# Patient Record
Sex: FEMALE | Race: WHITE | NOT HISPANIC OR LATINO | Employment: OTHER | URBAN - METROPOLITAN AREA
[De-identification: names, ages, dates, MRNs, and addresses within clinical notes are randomized per-mention and may not be internally consistent; named-entity substitution may affect disease eponyms.]

---

## 2017-01-26 ENCOUNTER — TRANSCRIBE ORDERS (OUTPATIENT)
Dept: ADMINISTRATIVE | Facility: HOSPITAL | Age: 72
End: 2017-01-26

## 2017-01-26 DIAGNOSIS — Z12.31 VISIT FOR SCREENING MAMMOGRAM: Primary | ICD-10-CM

## 2017-03-02 ENCOUNTER — HOSPITAL ENCOUNTER (OUTPATIENT)
Dept: MAMMOGRAPHY | Facility: HOSPITAL | Age: 72
Discharge: HOME | End: 2017-03-02
Admitting: FAMILY MEDICINE

## 2017-03-02 DIAGNOSIS — Z12.31 VISIT FOR SCREENING MAMMOGRAM: ICD-10-CM

## 2017-03-02 PROCEDURE — 77063 BREAST TOMOSYNTHESIS BI: CPT

## 2017-03-02 PROCEDURE — G0202 SCR MAMMO BI INCL CAD: HCPCS | Performed by: RADIOLOGY

## 2017-03-02 PROCEDURE — 77063 BREAST TOMOSYNTHESIS BI: CPT | Performed by: RADIOLOGY

## 2017-03-02 PROCEDURE — G0202 SCR MAMMO BI INCL CAD: HCPCS

## 2018-02-19 ENCOUNTER — TRANSCRIBE ORDERS (OUTPATIENT)
Dept: MAMMOGRAPHY | Facility: HOSPITAL | Age: 73
End: 2018-02-19

## 2018-02-19 DIAGNOSIS — Z12.31 VISIT FOR SCREENING MAMMOGRAM: Primary | ICD-10-CM

## 2018-03-22 ENCOUNTER — HOSPITAL ENCOUNTER (OUTPATIENT)
Dept: MAMMOGRAPHY | Facility: HOSPITAL | Age: 73
Discharge: HOME | End: 2018-03-22

## 2018-04-16 ENCOUNTER — APPOINTMENT (OUTPATIENT)
Dept: MAMMOGRAPHY | Facility: HOSPITAL | Age: 73
End: 2018-04-16

## 2018-04-24 ENCOUNTER — HOSPITAL ENCOUNTER (OUTPATIENT)
Dept: MAMMOGRAPHY | Facility: HOSPITAL | Age: 73
Discharge: HOME | End: 2018-04-24
Admitting: FAMILY MEDICINE

## 2018-04-24 DIAGNOSIS — Z12.31 VISIT FOR SCREENING MAMMOGRAM: ICD-10-CM

## 2018-04-24 PROCEDURE — 77067 SCR MAMMO BI INCL CAD: CPT | Performed by: RADIOLOGY

## 2018-04-24 PROCEDURE — 77063 BREAST TOMOSYNTHESIS BI: CPT

## 2018-04-24 PROCEDURE — 77067 SCR MAMMO BI INCL CAD: CPT

## 2018-04-24 PROCEDURE — 77063 BREAST TOMOSYNTHESIS BI: CPT | Performed by: RADIOLOGY

## 2018-05-17 ENCOUNTER — TRANSCRIBE ORDERS (OUTPATIENT)
Dept: MAMMOGRAPHY | Facility: HOSPITAL | Age: 73
End: 2018-05-17

## 2018-05-17 ENCOUNTER — HOSPITAL ENCOUNTER (OUTPATIENT)
Dept: MAMMOGRAPHY | Facility: HOSPITAL | Age: 73
Discharge: HOME | End: 2018-05-17
Admitting: FAMILY MEDICINE

## 2018-05-17 ENCOUNTER — HOSPITAL ENCOUNTER (OUTPATIENT)
Dept: ULTRASOUND IMAGING | Facility: HOSPITAL | Age: 73
Discharge: HOME | End: 2018-05-17

## 2018-05-17 DIAGNOSIS — R92.8 ABNORMAL MAMMOGRAM: Primary | ICD-10-CM

## 2018-05-17 DIAGNOSIS — R92.8 ABNORMAL MAMMOGRAM: ICD-10-CM

## 2018-05-17 PROCEDURE — 77065 DX MAMMO INCL CAD UNI: CPT | Performed by: RADIOLOGY

## 2018-05-17 PROCEDURE — 76642 ULTRASOUND BREAST LIMITED: CPT

## 2018-05-17 PROCEDURE — 77065 DX MAMMO INCL CAD UNI: CPT

## 2018-05-17 PROCEDURE — G0279 TOMOSYNTHESIS, MAMMO: HCPCS | Performed by: RADIOLOGY

## 2018-05-17 PROCEDURE — G0279 TOMOSYNTHESIS, MAMMO: HCPCS

## 2018-05-17 PROCEDURE — 76642 ULTRASOUND BREAST LIMITED: CPT | Performed by: RADIOLOGY

## 2018-11-19 ENCOUNTER — HOSPITAL ENCOUNTER (OUTPATIENT)
Dept: MAMMOGRAPHY | Facility: HOSPITAL | Age: 73
Discharge: HOME | End: 2018-11-19
Admitting: FAMILY MEDICINE

## 2018-11-19 ENCOUNTER — TRANSCRIBE ORDERS (OUTPATIENT)
Dept: MAMMOGRAPHY | Facility: HOSPITAL | Age: 73
End: 2018-11-19

## 2018-11-19 DIAGNOSIS — R92.8 ABNORMAL MAMMOGRAM: Primary | ICD-10-CM

## 2018-11-19 DIAGNOSIS — R92.8 ABNORMAL MAMMOGRAM: ICD-10-CM

## 2018-11-19 PROCEDURE — 77065 DX MAMMO INCL CAD UNI: CPT | Performed by: RADIOLOGY

## 2018-11-19 PROCEDURE — 77065 DX MAMMO INCL CAD UNI: CPT

## 2018-11-27 ENCOUNTER — APPOINTMENT (OUTPATIENT)
Dept: MAMMOGRAPHY | Facility: HOSPITAL | Age: 73
End: 2018-11-27

## 2019-05-21 ENCOUNTER — APPOINTMENT (OUTPATIENT)
Dept: MAMMOGRAPHY | Facility: HOSPITAL | Age: 74
End: 2019-05-21

## 2019-07-16 ENCOUNTER — HOSPITAL ENCOUNTER (OUTPATIENT)
Dept: MAMMOGRAPHY | Facility: HOSPITAL | Age: 74
Discharge: HOME | End: 2019-07-16
Admitting: FAMILY MEDICINE

## 2019-07-16 DIAGNOSIS — R92.8 ABNORMAL MAMMOGRAM: ICD-10-CM

## 2019-07-16 PROCEDURE — G0279 TOMOSYNTHESIS, MAMMO: HCPCS | Performed by: RADIOLOGY

## 2019-07-16 PROCEDURE — 77066 DX MAMMO INCL CAD BI: CPT | Performed by: RADIOLOGY

## 2019-07-16 PROCEDURE — G0279 TOMOSYNTHESIS, MAMMO: HCPCS

## 2019-07-16 PROCEDURE — 77066 DX MAMMO INCL CAD BI: CPT

## 2019-07-29 ENCOUNTER — TRANSCRIBE ORDERS (OUTPATIENT)
Dept: ADMINISTRATIVE | Facility: HOSPITAL | Age: 74
End: 2019-07-29

## 2019-07-29 DIAGNOSIS — Z12.31 VISIT FOR SCREENING MAMMOGRAM: Primary | ICD-10-CM

## 2020-07-23 ENCOUNTER — HOSPITAL ENCOUNTER (OUTPATIENT)
Dept: MAMMOGRAPHY | Facility: HOSPITAL | Age: 75
Discharge: HOME | End: 2020-07-23
Admitting: FAMILY MEDICINE

## 2020-07-23 DIAGNOSIS — Z12.31 VISIT FOR SCREENING MAMMOGRAM: ICD-10-CM

## 2020-07-23 PROCEDURE — 77067 SCR MAMMO BI INCL CAD: CPT

## 2020-07-23 PROCEDURE — 77063 BREAST TOMOSYNTHESIS BI: CPT

## 2020-07-23 PROCEDURE — 77067 SCR MAMMO BI INCL CAD: CPT | Performed by: RADIOLOGY

## 2020-07-23 PROCEDURE — 77063 BREAST TOMOSYNTHESIS BI: CPT | Performed by: RADIOLOGY

## 2020-09-10 ENCOUNTER — TRANSCRIBE ORDERS (OUTPATIENT)
Dept: ADMINISTRATIVE | Facility: HOSPITAL | Age: 75
End: 2020-09-10

## 2020-09-10 DIAGNOSIS — Z12.31 VISIT FOR SCREENING MAMMOGRAM: Primary | ICD-10-CM

## 2021-07-26 ENCOUNTER — HOSPITAL ENCOUNTER (OUTPATIENT)
Dept: MAMMOGRAPHY | Facility: HOSPITAL | Age: 76
Discharge: HOME | End: 2021-07-26
Admitting: FAMILY MEDICINE

## 2021-07-26 DIAGNOSIS — Z12.31 VISIT FOR SCREENING MAMMOGRAM: ICD-10-CM

## 2021-07-26 PROCEDURE — 77063 BREAST TOMOSYNTHESIS BI: CPT

## 2021-07-26 PROCEDURE — 77063 BREAST TOMOSYNTHESIS BI: CPT | Performed by: RADIOLOGY

## 2021-07-26 PROCEDURE — 77067 SCR MAMMO BI INCL CAD: CPT | Performed by: RADIOLOGY

## 2021-07-26 PROCEDURE — 77067 SCR MAMMO BI INCL CAD: CPT

## 2021-11-22 ENCOUNTER — TRANSCRIBE ORDERS (OUTPATIENT)
Dept: ADMINISTRATIVE | Facility: HOSPITAL | Age: 76
End: 2021-11-22

## 2021-11-22 DIAGNOSIS — Z12.31 VISIT FOR SCREENING MAMMOGRAM: Primary | ICD-10-CM

## 2022-06-09 ENCOUNTER — OFFICE VISIT (OUTPATIENT)
Dept: ENDOCRINOLOGY | Facility: CLINIC | Age: 77
End: 2022-06-09

## 2022-06-09 ENCOUNTER — LAB (OUTPATIENT)
Dept: LAB | Facility: HOSPITAL | Age: 77
End: 2022-06-09

## 2022-06-09 VITALS
OXYGEN SATURATION: 97 % | HEART RATE: 69 BPM | DIASTOLIC BLOOD PRESSURE: 62 MMHG | WEIGHT: 148.4 LBS | BODY MASS INDEX: 25.33 KG/M2 | SYSTOLIC BLOOD PRESSURE: 124 MMHG | HEIGHT: 64 IN

## 2022-06-09 DIAGNOSIS — E03.9 HYPOTHYROIDISM (ACQUIRED): ICD-10-CM

## 2022-06-09 DIAGNOSIS — R73.9 HYPERGLYCEMIA: ICD-10-CM

## 2022-06-09 DIAGNOSIS — E03.9 HYPOTHYROIDISM (ACQUIRED): Primary | ICD-10-CM

## 2022-06-09 PROCEDURE — 99203 OFFICE O/P NEW LOW 30 MIN: CPT | Performed by: INTERNAL MEDICINE

## 2022-06-09 RX ORDER — LOVASTATIN 40 MG/1
40 TABLET ORAL NIGHTLY
COMMUNITY
Start: 2022-05-16

## 2022-06-09 RX ORDER — LEVOTHYROXINE SODIUM 0.05 MG/1
50 TABLET ORAL DAILY
COMMUNITY
Start: 2022-05-05

## 2022-06-09 RX ORDER — MULTIVIT-MIN/IRON/FOLIC ACID/K 18-600-40
CAPSULE ORAL
COMMUNITY

## 2022-06-09 RX ORDER — PHENOL 1.4 %
600 AEROSOL, SPRAY (ML) MUCOUS MEMBRANE DAILY
COMMUNITY

## 2022-06-09 NOTE — ASSESSMENT & PLAN NOTE
A1c was 5.7%.  We discussed option of metformin to help with weight.  Will not start medication yet.

## 2022-06-09 NOTE — ASSESSMENT & PLAN NOTE
Check TFTs today.  Continue T4.  Take calcium separately from the T4.  We discussed goal for TSH of 1-2 range.    We discussed biotin supplement for hair loss also.  She would need to stop this a couple of days before getting labs done though.

## 2022-06-09 NOTE — PROGRESS NOTES
"     Office Note      Date: 2022  Patient Name: Alee Recinos  MRN: [id number]  : 1945    Chief Complaint   Patient presents with   • Hypothyroidism       History of Present Illness:   Alee Recinos is a 76 y.o. female who presents for Hypothyroidism    She reports h/o hypothyroidism.  She has been treated with T4.  She is currently taking 50mcg qd.  She is taking this on an empty stomach.  She is taking calcium concurrently.  She c/o inability to lose weight.  She notes hair loss.  She notes occ anxiousness.  She denies any other sxs of hypo- or hyperthyroidism at this time.  She denies any h/o goiter.  She hasn't noted any change in the size of her neck.  She denies any compressive sxs.  She had labs done 2022 that showed TSH of 2.88.  She denies any biotin use.  She says her beautician commented on hair loss.  She saw her PCP who did labs that were okay.  She started using rogaine topically.  This seems to have helped.      Subjective      Patient was born where: [place].  Facial radiation exposure: No.  High iodine intake: No  Family hx of thyroid disease: Yes, describe: mother.    Review of Systems:   Review of Systems   Constitutional: Negative.    HENT: Positive for tinnitus.    Eyes: Negative.    Respiratory: Negative.    Cardiovascular: Negative.    Gastrointestinal: Negative.    Endocrine: Negative.    Genitourinary: Negative.    Musculoskeletal: Negative.    Skin: Negative.         Hair Loss   Allergic/Immunologic: Negative.    Neurological: Negative.    Hematological: Negative.    Psychiatric/Behavioral: The patient is nervous/anxious.        The following portions of the patient's history were reviewed and updated as appropriate: allergies, current medications, past family history, past medical history, past social history, past surgical history and problem list.    Objective     Visit Vitals  /62   Pulse 69   Ht 162.6 cm (64\")   Wt 67.3 kg (148 lb 6.4 oz)   SpO2 97%   BMI " 25.47 kg/m²       Physical Exam:  Physical Exam  Constitutional:       Appearance: Normal appearance.   HENT:      Head: Normocephalic and atraumatic.   Eyes:      Extraocular Movements: Extraocular movements intact.      Conjunctiva/sclera: Conjunctivae normal.      Pupils: Pupils are equal, round, and reactive to light.   Neck:      Thyroid: No thyroid mass, thyromegaly or thyroid tenderness.   Cardiovascular:      Rate and Rhythm: Normal rate and regular rhythm.      Pulses: Normal pulses.      Heart sounds: Normal heart sounds.   Pulmonary:      Effort: Pulmonary effort is normal.      Breath sounds: Normal breath sounds.   Abdominal:      General: Bowel sounds are normal.      Palpations: Abdomen is soft.   Musculoskeletal:         General: Normal range of motion.      Cervical back: Normal range of motion and neck supple.   Lymphadenopathy:      Cervical: No cervical adenopathy.   Skin:     General: Skin is warm and dry.   Neurological:      General: No focal deficit present.      Mental Status: She is alert.   Psychiatric:         Mood and Affect: Mood normal.         Behavior: Behavior normal.         Thought Content: Thought content normal.         Judgment: Judgment normal.         Labs:    TSH  No results found for: TSHBASE     Free T4  No results found for: FREET4    T3  No results found for: [id number]      TPO  No results found for: THYROIDAB    TG AB  No results found for: THGAB    TG  No results found for: THYROGLB    CBC w/DIFF  Lab Results   Component Value Date    WBC 8.68 01/26/2022    RBC 4.76 01/26/2022    HGB 14.6 01/26/2022    HCT 44.8 01/26/2022    MCV 94 01/26/2022    MCH 30.7 01/26/2022    MCHC 32.6 01/26/2022    RDW 12.4 01/26/2022    MPV 11.0 01/26/2022     01/26/2022    NRBC 0.0 01/26/2022           Assessment / Plan      Assessment & Plan:  Diagnoses and all orders for this visit:    1. Hypothyroidism (acquired) (Primary)  Assessment & Plan:  Check TFTs today.  Continue T4.  Take  calcium separately from the T4.  We discussed goal for TSH of 1-2 range.    We discussed biotin supplement for hair loss also.  She would need to stop this a couple of days before getting labs done though.    Orders:  -     TSH; Future  -     T4, Free; Future    2. Hyperglycemia  Assessment & Plan:  A1c was 5.7%.  We discussed option of metformin to help with weight.  Will not start medication yet.         Return in about 3 months (around 9/9/2022) for Recheck with TSH.    Mario Fischer MD   06/09/2022

## 2022-06-10 LAB
T4 FREE SERPL-MCNC: 1.24 NG/DL (ref 0.93–1.7)
TSH SERPL DL<=0.005 MIU/L-ACNC: 2.93 UIU/ML (ref 0.27–4.2)

## 2022-07-29 ENCOUNTER — APPOINTMENT (OUTPATIENT)
Dept: MAMMOGRAPHY | Facility: HOSPITAL | Age: 77
End: 2022-07-29

## 2022-08-03 ENCOUNTER — HOSPITAL ENCOUNTER (OUTPATIENT)
Dept: MAMMOGRAPHY | Facility: HOSPITAL | Age: 77
Discharge: HOME | End: 2022-08-03
Admitting: FAMILY MEDICINE

## 2022-08-03 DIAGNOSIS — Z12.31 VISIT FOR SCREENING MAMMOGRAM: ICD-10-CM

## 2022-08-03 PROCEDURE — 77067 SCR MAMMO BI INCL CAD: CPT

## 2022-08-03 PROCEDURE — 77063 BREAST TOMOSYNTHESIS BI: CPT | Performed by: RADIOLOGY

## 2022-08-03 PROCEDURE — 77067 SCR MAMMO BI INCL CAD: CPT | Performed by: RADIOLOGY

## 2022-08-03 PROCEDURE — 77063 BREAST TOMOSYNTHESIS BI: CPT

## 2023-01-19 ENCOUNTER — OFFICE VISIT (OUTPATIENT)
Dept: ENDOCRINOLOGY | Facility: CLINIC | Age: 78
End: 2023-01-19
Payer: MEDICARE

## 2023-01-19 VITALS
BODY MASS INDEX: 24.07 KG/M2 | DIASTOLIC BLOOD PRESSURE: 70 MMHG | OXYGEN SATURATION: 98 % | HEIGHT: 64 IN | HEART RATE: 68 BPM | SYSTOLIC BLOOD PRESSURE: 128 MMHG | WEIGHT: 141 LBS

## 2023-01-19 DIAGNOSIS — R73.9 HYPERGLYCEMIA: ICD-10-CM

## 2023-01-19 DIAGNOSIS — E03.9 HYPOTHYROIDISM (ACQUIRED): Primary | ICD-10-CM

## 2023-01-19 PROCEDURE — 36415 COLL VENOUS BLD VENIPUNCTURE: CPT | Performed by: INTERNAL MEDICINE

## 2023-01-19 PROCEDURE — 99213 OFFICE O/P EST LOW 20 MIN: CPT | Performed by: INTERNAL MEDICINE

## 2023-01-19 NOTE — PROGRESS NOTES
"     Office Note      Date: 2023  Patient Name: Alee Recinos  MRN: [id number]  : 1945    Chief Complaint   Patient presents with   • Hypothyroidism       History of Present Illness:   Alee Recinos is a 77 y.o. female who presents for Hypothyroidism    She remains on T4 50mcg qd.  She is taking this on an empty stomach.  She is taking calcium separately.  She has been able to lose weight.  She notes hair loss but this has improved with Rogaine use.  She notes occ anxiousness.  She denies any other sxs of hypo- or hyperthyroidism at this time.  She hasn't noted any change in the size of her neck.  She denies any compressive sxs    Subjective      Review of Systems:   Review of Systems   Constitutional: Negative.    Cardiovascular: Negative.    Gastrointestinal: Negative.    Endocrine: Negative.        The following portions of the patient's history were reviewed and updated as appropriate: allergies, current medications, past family history, past medical history, past social history, past surgical history and problem list.    Objective     Visit Vitals  /70   Pulse 68   Ht 162.6 cm (64\")   Wt 64 kg (141 lb)   SpO2 98%   BMI 24.20 kg/m²       Physical Exam:  Physical Exam  Constitutional:       Appearance: Normal appearance.   Neck:      Thyroid: No thyroid mass, thyromegaly or thyroid tenderness.   Lymphadenopathy:      Cervical: No cervical adenopathy.   Neurological:      Mental Status: She is alert.         Labs:    TSH  No results found for: TSHBASE     Free T4  Free T4   Date Value Ref Range Status   2022 1.24 0.93 - 1.70 ng/dL Final     Comment:     Results may be falsely increased if patient taking Biotin.       T3  No results found for: Q0MMFHA      TPO  No results found for: THYROIDAB    TG AB  No results found for: THGAB    TG  No results found for: THYROGLB    CBC w/DIFF  Lab Results   Component Value Date    WBC 8.68 2022    RBC 4.76 2022    HGB 14.6 2022    " HCT 44.8 01/26/2022    MCV 94 01/26/2022    MCH 30.7 01/26/2022    MCHC 32.6 01/26/2022    RDW 12.4 01/26/2022    MPV 11.0 01/26/2022     01/26/2022    NRBC 0.0 01/26/2022           Assessment / Plan      Assessment & Plan:  Diagnoses and all orders for this visit:    1. Hypothyroidism (acquired) (Primary)  Assessment & Plan:  Continue T4.  Check TSH.    Orders:  -     TSH; Future    2. Hyperglycemia  Assessment & Plan:  She reports A1c in the fall with her PCP was back to normal.  Continue to work on diet/exercise.      Current Outpatient Medications   Medication Instructions   • Ascorbic Acid (Vitamin C) 500 MG capsule Oral   • calcium carbonate (OS-AISLINN) 600 mg, Oral, Daily   • levothyroxine (SYNTHROID, LEVOTHROID) 50 mcg, Oral, Daily, morning   • lovastatin (MEVACOR) 40 mg, Oral, Nightly   • Magnesium 400 MG capsule Oral, Daily      Return in about 6 months (around 7/19/2023) for Recheck with TSH.    Mario Fischer MD   01/19/2023

## 2023-01-20 LAB — TSH SERPL DL<=0.005 MIU/L-ACNC: 2.62 UIU/ML (ref 0.45–4.5)

## 2023-05-18 ENCOUNTER — TRANSCRIBE ORDERS (OUTPATIENT)
Dept: ADMINISTRATIVE | Facility: HOSPITAL | Age: 78
End: 2023-05-18
Payer: MEDICARE

## 2023-05-18 DIAGNOSIS — Z12.31 BREAST CANCER SCREENING BY MAMMOGRAM: Primary | ICD-10-CM

## 2023-08-10 ENCOUNTER — HOSPITAL ENCOUNTER (OUTPATIENT)
Dept: MAMMOGRAPHY | Facility: HOSPITAL | Age: 78
Discharge: HOME | End: 2023-08-10
Admitting: FAMILY MEDICINE
Payer: MEDICARE

## 2023-08-10 DIAGNOSIS — Z12.31 BREAST CANCER SCREENING BY MAMMOGRAM: ICD-10-CM

## 2023-08-10 PROCEDURE — 77063 BREAST TOMOSYNTHESIS BI: CPT

## 2023-08-10 PROCEDURE — 77067 SCR MAMMO BI INCL CAD: CPT

## 2024-05-16 ENCOUNTER — TRANSCRIBE ORDERS (OUTPATIENT)
Dept: ADMINISTRATIVE | Facility: HOSPITAL | Age: 79
End: 2024-05-16
Payer: MEDICARE

## 2024-05-16 DIAGNOSIS — Z12.31 ENCOUNTER FOR SCREENING MAMMOGRAM FOR BREAST CANCER: Primary | ICD-10-CM

## 2024-07-18 ENCOUNTER — OFFICE VISIT (OUTPATIENT)
Dept: ENDOCRINOLOGY | Facility: CLINIC | Age: 79
End: 2024-07-18
Payer: MEDICARE

## 2024-07-18 VITALS
BODY MASS INDEX: 24.07 KG/M2 | HEIGHT: 64 IN | WEIGHT: 141 LBS | SYSTOLIC BLOOD PRESSURE: 122 MMHG | OXYGEN SATURATION: 100 % | DIASTOLIC BLOOD PRESSURE: 62 MMHG | HEART RATE: 72 BPM

## 2024-07-18 DIAGNOSIS — E03.9 HYPOTHYROIDISM (ACQUIRED): Primary | ICD-10-CM

## 2024-07-18 PROCEDURE — 99213 OFFICE O/P EST LOW 20 MIN: CPT | Performed by: INTERNAL MEDICINE

## 2024-07-18 PROCEDURE — 1159F MED LIST DOCD IN RCRD: CPT | Performed by: INTERNAL MEDICINE

## 2024-07-18 PROCEDURE — 1160F RVW MEDS BY RX/DR IN RCRD: CPT | Performed by: INTERNAL MEDICINE

## 2024-07-18 NOTE — PROGRESS NOTES
"     Office Note      Date: 2024  Patient Name: Alee Recinos  MRN: [id number]  : 1945    Chief Complaint   Patient presents with    Thyroid Problem     Hypothyroidism (acquired)         History of Present Illness:   Alee Recinos is a 78 y.o. female who presents for Thyroid Problem (Hypothyroidism (acquired)/)    She remains on T4 50mcg qd.  She is taking this on an empty stomach.  She is taking calcium separately.  She notes hair loss has improved with Rogaine use.  She notes occ anxiousness.  She denies any other sxs of hypo- or hyperthyroidism at this time.  She hasn't noted any change in the size of her neck.  She denies any compressive sxs.     Subjective      Review of Systems:   Review of Systems   Constitutional: Negative.    Cardiovascular: Negative.    Gastrointestinal: Negative.    Endocrine: Negative.        The following portions of the patient's history were reviewed and updated as appropriate: allergies, current medications, past family history, past medical history, past social history, past surgical history, and problem list.    Objective     Visit Vitals  /62 (BP Location: Left arm, Patient Position: Sitting, Cuff Size: Adult)   Pulse 72   Ht 162.6 cm (64\")   Wt 64 kg (141 lb)   SpO2 100%   BMI 24.20 kg/m²       Physical Exam:  Physical Exam  Constitutional:       Appearance: Normal appearance.   Neck:      Thyroid: No thyroid mass, thyromegaly or thyroid tenderness.   Lymphadenopathy:      Cervical: No cervical adenopathy.   Neurological:      Mental Status: She is alert.         Labs:    TSH  No results found for: \"TSHBASE\"     Free T4  Free T4   Date Value Ref Range Status   2022 1.24 0.93 - 1.70 ng/dL Final     Comment:     Results may be falsely increased if patient taking Biotin.       T3  No results found for: \"W7CVLOQ\"      TPO  No results found for: \"THYROIDAB\"    TG AB  No results found for: \"THGAB\"    TG  No results found for: \"THYROGLB\"    CBC w/DIFF  Lab " Results   Component Value Date    WBC 8.68 01/26/2022    RBC 4.76 01/26/2022    HGB 14.6 01/26/2022    HCT 44.8 01/26/2022    MCV 94 01/26/2022    MCH 30.7 01/26/2022    MCHC 32.6 01/26/2022    RDW 12.4 01/26/2022    MPV 11.0 01/26/2022     01/26/2022    NRBC 0.0 01/26/2022           Assessment / Plan      Assessment & Plan:  Diagnoses and all orders for this visit:    1. Hypothyroidism (acquired) (Primary)  Assessment & Plan:  Recent TSH at goal.  Continue current T4 dose.  No goiter on exam.        Current Outpatient Medications   Medication Instructions    Ascorbic Acid (Vitamin C) 500 MG capsule Oral    calcium carbonate (OS-AISLINN) 600 mg, Oral, Daily    levothyroxine (SYNTHROID, LEVOTHROID) 50 mcg, Oral, Daily, morning    lovastatin (MEVACOR) 40 mg, Oral, Nightly    Magnesium 400 MG capsule Oral, Daily      Return in about 1 year (around 7/18/2025) for Recheck with TSH.    Electronically signed by: Mario Fischer MD  07/18/2024

## 2024-08-01 LAB
NCCN CRITERIA FLAG: NORMAL
TYRER CUZICK SCORE: 2.3

## 2024-08-15 ENCOUNTER — HOSPITAL ENCOUNTER (OUTPATIENT)
Dept: MAMMOGRAPHY | Facility: HOSPITAL | Age: 79
Discharge: HOME | End: 2024-08-15
Admitting: FAMILY MEDICINE
Payer: MEDICARE

## 2024-08-15 DIAGNOSIS — Z12.31 ENCOUNTER FOR SCREENING MAMMOGRAM FOR BREAST CANCER: ICD-10-CM

## 2024-08-15 PROCEDURE — 77067 SCR MAMMO BI INCL CAD: CPT

## 2024-08-15 PROCEDURE — 77063 BREAST TOMOSYNTHESIS BI: CPT

## 2025-05-23 ENCOUNTER — TRANSCRIBE ORDERS (OUTPATIENT)
Dept: ADMINISTRATIVE | Facility: HOSPITAL | Age: 80
End: 2025-05-23
Payer: MEDICARE

## 2025-05-23 DIAGNOSIS — Z12.31 ENCOUNTER FOR SCREENING MAMMOGRAM FOR BREAST CANCER: Primary | ICD-10-CM

## 2025-07-24 ENCOUNTER — OFFICE VISIT (OUTPATIENT)
Dept: ENDOCRINOLOGY | Facility: CLINIC | Age: 80
End: 2025-07-24
Payer: MEDICARE

## 2025-07-24 VITALS
OXYGEN SATURATION: 99 % | HEART RATE: 66 BPM | WEIGHT: 147.8 LBS | HEIGHT: 64 IN | BODY MASS INDEX: 25.23 KG/M2 | SYSTOLIC BLOOD PRESSURE: 144 MMHG | DIASTOLIC BLOOD PRESSURE: 70 MMHG

## 2025-07-24 DIAGNOSIS — E03.9 HYPOTHYROIDISM (ACQUIRED): Primary | ICD-10-CM

## 2025-07-24 PROCEDURE — 1160F RVW MEDS BY RX/DR IN RCRD: CPT | Performed by: INTERNAL MEDICINE

## 2025-07-24 PROCEDURE — 1159F MED LIST DOCD IN RCRD: CPT | Performed by: INTERNAL MEDICINE

## 2025-07-24 PROCEDURE — 99213 OFFICE O/P EST LOW 20 MIN: CPT | Performed by: INTERNAL MEDICINE

## 2025-07-24 RX ORDER — LATANOPROST 50 UG/ML
1 SOLUTION/ DROPS OPHTHALMIC NIGHTLY
COMMUNITY
Start: 2025-06-19

## 2025-07-24 RX ORDER — FLUOCINONIDE 0.5 MG/G
1 OINTMENT TOPICAL 2 TIMES DAILY
COMMUNITY
Start: 2025-06-04 | End: 2026-06-04

## 2025-07-24 NOTE — PROGRESS NOTES
"     Office Note      Date: 2025  Patient Name: Alee Recinos  MRN: [id number]  : 1945    Chief Complaint   Patient presents with    Hypothyroidism       History of Present Illness:   Alee Recinos is a 79 y.o. female who presents for Hypothyroidism    She remains on T4 50mcg qd.  She is taking this on an empty stomach.  She is taking calcium separately.  She notes hair loss has improved.  She is no longer on Rogaine.  She notes occ anxiousness.  She denies any other sxs of hypo- or hyperthyroidism at this time.  She hasn't noted any change in the size of her neck.  She denies any compressive sxs.     Subjective      Review of Systems:   Review of Systems   Constitutional: Negative.    Cardiovascular: Negative.    Gastrointestinal: Negative.    Endocrine: Negative.        The following portions of the patient's history were reviewed and updated as appropriate: allergies, current medications, past family history, past medical history, past social history, past surgical history, and problem list.    Objective     Visit Vitals  /70 (BP Location: Left arm, Patient Position: Sitting)   Pulse 66   Ht 162.6 cm (64.02\")   Wt 67 kg (147 lb 12.8 oz)   SpO2 99%   BMI 25.36 kg/m²       Physical Exam:  Physical Exam  Constitutional:       Appearance: Normal appearance.   Neck:      Thyroid: No thyroid mass, thyromegaly or thyroid tenderness.   Lymphadenopathy:      Cervical: No cervical adenopathy.   Neurological:      Mental Status: She is alert.         Labs:    TSH  No results found for: \"TSHBASE\"     Free T4  Free T4   Date Value Ref Range Status   2022 1.24 0.93 - 1.70 ng/dL Final     Comment:     Results may be falsely increased if patient taking Biotin.       T3  No results found for: \"E4HYJBP\"      TPO  No results found for: \"THYROIDAB\"    TG AB  No results found for: \"THGAB\"    TG  No results found for: \"THYROGLB\"    CBC w/DIFF  Lab Results   Component Value Date    WBC 8.75 2025    RBC " 4.53 05/23/2025    HGB 13.9 05/23/2025    HCT 43.2 05/23/2025    MCV 95 05/23/2025    MCH 30.7 05/23/2025    MCHC 32.2 05/23/2025    RDW 13.2 05/23/2025    MPV 11.3 05/23/2025     05/23/2025    NRBC 0.0 05/23/2025           Assessment / Plan      Assessment & Plan:  Diagnoses and all orders for this visit:    1. Hypothyroidism (acquired) (Primary)  Assessment & Plan:  Continue levothyroxine.  Recent TSH okay at 3.23.        Current Outpatient Medications   Medication Instructions    Ascorbic Acid (Vitamin C) 500 MG capsule Take  by mouth.    calcium carbonate (OS-AISLINN) 600 mg, Daily    fluocinonide (LIDEX) 0.05 % ointment 1 Application, 2 Times Daily    latanoprost (XALATAN) 0.005 % ophthalmic solution 1 drop, Nightly    levothyroxine (SYNTHROID, LEVOTHROID) 50 mcg, Daily    lovastatin (MEVACOR) 40 mg, Nightly    Magnesium 400 MG capsule Daily      Return in about 1 year (around 7/24/2026) for Recheck with TSH.    Electronically signed by: Mario Fischer MD  07/24/2025